# Patient Record
Sex: FEMALE | Race: WHITE | Employment: OTHER | URBAN - METROPOLITAN AREA
[De-identification: names, ages, dates, MRNs, and addresses within clinical notes are randomized per-mention and may not be internally consistent; named-entity substitution may affect disease eponyms.]

---

## 2017-11-14 ENCOUNTER — HOSPITAL ENCOUNTER (OUTPATIENT)
Dept: MAMMOGRAPHY | Age: 77
Discharge: HOME | End: 2017-11-14
Attending: INTERNAL MEDICINE
Payer: MEDICARE

## 2017-11-14 DIAGNOSIS — M85.88 OTHER SPECIFIED DISORDERS OF BONE DENSITY AND STRUCTURE, OTHER SITE: ICD-10-CM

## 2017-11-14 PROCEDURE — 77080 DXA BONE DENSITY AXIAL: CPT

## 2018-07-30 ENCOUNTER — HOSPITAL ENCOUNTER (EMERGENCY)
Age: 78
Discharge: HOME | End: 2018-07-30
Attending: EMERGENCY MEDICINE
Payer: MEDICARE

## 2018-07-30 VITALS
HEART RATE: 91 BPM | BODY MASS INDEX: 30.51 KG/M2 | RESPIRATION RATE: 16 BRPM | OXYGEN SATURATION: 99 % | HEIGHT: 61 IN | SYSTOLIC BLOOD PRESSURE: 175 MMHG | TEMPERATURE: 97.9 F | DIASTOLIC BLOOD PRESSURE: 71 MMHG | WEIGHT: 161.6 LBS

## 2018-07-30 DIAGNOSIS — S61.002A AVULSION OF SKIN OF LEFT THUMB, INITIAL ENCOUNTER: Primary | ICD-10-CM

## 2018-07-30 PROCEDURE — 90471 IMMUNIZATION ADMIN: CPT

## 2018-07-30 PROCEDURE — 90715 TDAP VACCINE 7 YRS/> IM: CPT | Performed by: PHYSICIAN ASSISTANT

## 2018-07-30 PROCEDURE — 99282 EMERGENCY DEPT VISIT SF MDM: CPT

## 2018-07-30 PROCEDURE — 75810000293 HC SIMP/SUPERF WND  RPR

## 2018-07-30 PROCEDURE — 99283 EMERGENCY DEPT VISIT LOW MDM: CPT

## 2018-07-30 PROCEDURE — 74011250636 HC RX REV CODE- 250/636: Performed by: PHYSICIAN ASSISTANT

## 2018-07-30 RX ADMIN — TETANUS TOXOID, REDUCED DIPHTHERIA TOXOID AND ACELLULAR PERTUSSIS VACCINE, ADSORBED 0.5 ML: 5; 2.5; 8; 8; 2.5 SUSPENSION INTRAMUSCULAR at 21:17

## 2018-07-31 NOTE — ED PROVIDER NOTES
EMERGENCY DEPARTMENT HISTORY AND PHYSICAL EXAM      Date: 7/30/2018  Patient Name: Dartha Epley    History of Presenting Illness     Chief Complaint   Patient presents with    Laceration     \"I cut the tip of my finger off. \" occured at 1100 today       History Provided By: Patient    HPI: Dartha Epley, 68 y.o. female presents ambulatory to the ED with cc of 8 hours of constant aching, 1 out of 10 left thumb pain that started after cutting the tip of her thumb with a very sharp knife while cutting cantaloupe. She does take Warfarin and has had a couple of different pressure dressings but they have bled through. Tetanus was last updated in 2012. Chief Complaint: left thumb laceration  Duration: 8 Hours  Timing:  Constant  Location: tip of left thumb  Quality: Aching  Severity: 1 out of 10  Modifying Factors: her dressings / bandages have bled through  Associated Symptoms: denies any other associated signs or symptoms    There are no other complaints, changes, or physical findings at this time. PCP: Tracey Dakin, MD    Current Outpatient Prescriptions   Medication Sig Dispense Refill    warfarin (COUMADIN) 2.5 mg tablet TAKE TWO TABLETS DAILY EXCEPT TAKE ONE AND ONE-HALF TABLETS ON WEDNESDAYS 165 Tab 94    glipiZIDE (GLUCOTROL) 5 mg tablet TAKE 2 TABLETS DAILY 180 Tab 4    LORazepam (ATIVAN) 1 mg tablet Take 1 Tab by mouth every eight (8) hours as needed for Anxiety. Max Daily Amount: 3 mg.  40 Tab 1    enalapril (VASOTEC) 20 mg tablet TAKE 1 TABLET TWICE A  Tab 2    triamterene-hydroCHLOROthiazide (MAXZIDE) 75-50 mg per tablet TAKE 1 TABLET BY MOUTH EVERY DAY 90 Tab 3    metoprolol tartrate (LOPRESSOR) 50 mg tablet TAKE 1/2 TABLET BY MOUTH TWICE DAILY 90 Tab 3    atorvastatin (LIPITOR) 20 mg tablet TAKE 1 TABLET DAILY 90 Tab 3    potassium chloride (K-DUR, KLOR-CON) 20 mEq tablet TAKE 1 TABLET DAILY 90 Tab 3    glucose blood VI test strips (FREESTYLE TEST) strip Check blood sugar twice a day 100 Strip 12    traMADol (ULTRAM) 50 mg tablet Take 1 Tab by mouth every six (6) hours as needed for Pain. Max Daily Amount: 200 mg. 40 Tab 1    famotidine (PEPCID) 20 mg tablet Take 20 mg by mouth two (2) times a day.  famotidine (PEPCID) 20 mg tablet Take 20 mg by mouth two (2) times a day.  warfarin (COUMADIN) 2.5 mg tablet Take 3.75 mg by mouth daily.  Cholecalciferol, Vitamin D3, (VITAMIN D) 1,000 unit Cap Take 1,000 Units by mouth every evening.  therapeutic multivitamin (THERAGRAN) tablet Take 1 Tab by mouth every evening.  calcium carbonate (TUMS E-X) 300 mg (750 mg) chewable tablet Take 2 Tabs by mouth daily (after dinner). Past History     Past Medical History:  Past Medical History:   Diagnosis Date    Anemia     iron deficiency, 2010. . recurrence in 2011    Anemia NEC     iron deficiency.  Arthritis     Autoimmune disease ([address]     POLYMYALGIA RHEUMATICA    Cancer ([address] 2004    SKIN CA CHEST ,renal ca-left, squamous cell ca in situ, left hand skin     DM (diabetes mellitus), type 2 ([address] 8/16/2011    NIDDM    GERD (gastroesophageal reflux disease) 2012    EGD: tortuous esophagus, gastric polypectomy    HTN (hypertension) 8/16/2011    HX OTHER MEDICAL     skin ca. excision    Hypercholesterolemia 8/16/2011    Osteopenia     Polymyalgia rheumatica (HCC)     Renal cell cancer ([facility] 5/12/2014    Clear cell type  Left partial nephrectomy , 5/7/14, Dr. Katy Sanchez Renal mass, left 4-2014    Stroke [facility]) 1994    x2       Past Surgical History:  Past Surgical History:   Procedure Laterality Date    ABDOMEN SURGERY PROC UNLISTED      LAPAROSCOPIC REL. ADHESIONS    BREAST SURGERY PROCEDURE UNLISTED  1990'S    PARTIAL MASTECTOMY  RIGHT    ENDOSCOPY, COLON, DIAGNOSTIC  3/10/10    diverticulosis.  Bipin Nj MD    HX CHOLECYSTECTOMY      HX COLONOSCOPY  3/10/10     Dr. Stan rand Ends HX GI  Feb 2014    RIAZ ; incisional herniorrahaphy y    Hermina Gouge HEENT      b/l cataract surgery    HX HEENT      tonsillectomy    HX OTHER SURGICAL  2012    EGD. Gastric Polypectomy    HX OTHER SURGICAL  5/7/14    left partial nephrectomy , dr. Montez Hardwick HX UROLOGICAL  April 2014    lft partial nephrectomy for ca.  VASCULAR SURGERY PROCEDURE UNLIST  11/13/14    left renal artery pseudoaneurysm ablation        Family History:  Family History   Problem Relation Age of Onset    Hypertension Mother     Kidney Disease Mother     Heart Attack Father     Heart Disease Father     Hypertension Father     Heart Attack Brother 46    Psychiatric Disorder Brother      bipolar    Heart Disease Brother     Stroke Maternal Grandmother        Social History:  Social History   Substance Use Topics    Smoking status: Never Smoker    Smokeless tobacco: Never Used    Alcohol use Yes      Comment: rare       Allergies: Allergies   Allergen Reactions    Macrobid [Nitrofurantoin Monohyd/M-Cryst] Hives and Rash    Metformin Hives and Rash    Ticlid [Ticlopidine] Hives         Review of Systems   Review of Systems   Constitutional: Negative for fatigue and fever. HENT: Negative for ear pain and sore throat. Eyes: Negative for pain, redness and visual disturbance. Respiratory: Negative for cough and shortness of breath. Cardiovascular: Negative for chest pain and palpitations. Gastrointestinal: Negative for abdominal pain, nausea and vomiting. Genitourinary: Negative for dysuria, frequency and urgency. Musculoskeletal: Negative for back pain, gait problem, neck pain and neck stiffness. Skin: Positive for wound. Negative for rash. Neurological: Negative for dizziness, weakness, light-headedness, numbness and headaches. Physical Exam   Physical Exam   Constitutional: She is oriented to person, place, and time. She appears well-developed and well-nourished. Non-toxic appearance. No distress.    HENT:   Head: Normocephalic and atraumatic. Right Ear: External ear normal.   Left Ear: External ear normal.   Nose: Nose normal.   Mouth/Throat: Uvula is midline. No trismus in the jaw. Eyes: Conjunctivae and EOM are normal. Pupils are equal, round, and reactive to light. No scleral icterus. Neck: Normal range of motion and full passive range of motion without pain. Cardiovascular: Normal rate and regular rhythm. Pulmonary/Chest: Effort normal. No accessory muscle usage. No tachypnea. No respiratory distress. She has no decreased breath sounds. She has no wheezes. Abdominal: Soft. There is no tenderness. Musculoskeletal: Normal range of motion. Left hand: She exhibits laceration. Hands:  7mm ovoid avulsion laceration to the tip of the left thumb not involving the nail. FAROM of all joints. Neurological: She is alert and oriented to person, place, and time. She is not disoriented. No cranial nerve deficit. GCS eye subscore is 4. GCS verbal subscore is 5. GCS motor subscore is 6. Skin: Skin is intact. No rash noted. Psychiatric: She has a normal mood and affect. Her speech is normal.   Nursing note and vitals reviewed. Diagnostic Study Results     Labs -   No results found for this or any previous visit (from the past 12 hour(s)). Radiologic Studies -   No orders to display     CT Results  (Last 48 hours)    None        CXR Results  (Last 48 hours)    None            Medical Decision Making   I am the first provider for this patient. I reviewed the vital signs, available nursing notes, past medical history, past surgical history, family history and social history. Vital Signs-Reviewed the patient's vital signs. Patient Vitals for the past 12 hrs:   Temp Pulse Resp BP SpO2   07/30/18 2101 97.9 °F (36.6 °C) 91 16 175/71 99 %     Records Reviewed: Nursing Notes and Old Medical Records    Provider Notes (Medical Decision Making): Will update tetanus.   Mechanism does not suggest the likelihood of fracture or foreign body: imaging deferred  Will permit to heal by secondary intention: sterile bulky pressure dressing applied. Wound care instructions. Return precautions. ED Course:   Initial assessment performed. The patients presenting problems have been discussed, and they are in agreement with the care plan formulated and outlined with them. I have encouraged them to ask questions as they arise throughout their visit. Disposition:  Discharge    PLAN:  1. Current Discharge Medication List        2. Follow-up Information     Follow up With Details Comments [address], [place], [place] Schedule an appointment as soon as possible for a visit As needed [address] [phone]          Return to ED if worse     Diagnosis     Clinical Impression:   1.  Avulsion of skin of left thumb, initial encounter

## 2018-07-31 NOTE — ED NOTES
Pt with lac to L hand thumb after using knife to cut cantaloupe. Pt states she is on Coumadin. Pt with dressing present, break through bleeding noticed at this time.

## 2018-07-31 NOTE — ED NOTES
Discharge instructions given to patient by BERYL Millard. Verbalized understanding of instructions. Patient discharged without difficulty. Patient discharged in stable condition via ambulation accompanied by .

## 2018-08-01 PROBLEM — E11.21 TYPE 2 DIABETES WITH NEPHROPATHY (HCC): Status: ACTIVE | Noted: 2018-08-01

## 2020-12-01 ENCOUNTER — HOSPITAL ENCOUNTER (OUTPATIENT)
Dept: GENERAL RADIOLOGY | Age: 80
Discharge: HOME | End: 2020-12-01
Attending: INTERNAL MEDICINE
Payer: MEDICARE

## 2020-12-01 DIAGNOSIS — M54.50 ACUTE BILATERAL LOW BACK PAIN WITHOUT SCIATICA: ICD-10-CM

## 2020-12-01 PROCEDURE — 72100 X-RAY EXAM L-S SPINE 2/3 VWS: CPT

## 2021-05-06 PROBLEM — S22.080A COMPRESSION FRACTURE OF T11 VERTEBRA (HCC): Status: ACTIVE | Noted: 2021-05-06

## 2022-03-19 PROBLEM — E11.21 TYPE 2 DIABETES WITH NEPHROPATHY (HCC): Status: ACTIVE | Noted: 2018-08-01

## 2022-03-19 PROBLEM — S22.080A COMPRESSION FRACTURE OF T11 VERTEBRA (HCC): Status: ACTIVE | Noted: 2021-05-06

## 2022-07-26 ENCOUNTER — OFFICE VISIT (OUTPATIENT)
Dept: URGENT CARE | Age: 82
End: 2022-07-26
Payer: MEDICARE

## 2022-07-26 VITALS
BODY MASS INDEX: 28.32 KG/M2 | DIASTOLIC BLOOD PRESSURE: 84 MMHG | SYSTOLIC BLOOD PRESSURE: 155 MMHG | RESPIRATION RATE: 18 BRPM | OXYGEN SATURATION: 97 % | HEIGHT: 61 IN | HEART RATE: 78 BPM | TEMPERATURE: 97.8 F | WEIGHT: 150 LBS

## 2022-07-26 DIAGNOSIS — M25.552 HIP PAIN, CHRONIC, LEFT: ICD-10-CM

## 2022-07-26 DIAGNOSIS — M54.42 CHRONIC BILATERAL LOW BACK PAIN WITH LEFT-SIDED SCIATICA: Primary | ICD-10-CM

## 2022-07-26 DIAGNOSIS — G89.29 HIP PAIN, CHRONIC, LEFT: ICD-10-CM

## 2022-07-26 DIAGNOSIS — G89.29 CHRONIC BILATERAL LOW BACK PAIN WITH LEFT-SIDED SCIATICA: Primary | ICD-10-CM

## 2022-07-26 PROCEDURE — 1090F PRES/ABSN URINE INCON ASSESS: CPT | Performed by: INTERNAL MEDICINE

## 2022-07-26 PROCEDURE — G8427 DOCREV CUR MEDS BY ELIG CLIN: HCPCS | Performed by: INTERNAL MEDICINE

## 2022-07-26 PROCEDURE — G8536 NO DOC ELDER MAL SCRN: HCPCS | Performed by: INTERNAL MEDICINE

## 2022-07-26 PROCEDURE — G8754 DIAS BP LESS 90: HCPCS | Performed by: INTERNAL MEDICINE

## 2022-07-26 PROCEDURE — G8753 SYS BP > OR = 140: HCPCS | Performed by: INTERNAL MEDICINE

## 2022-07-26 PROCEDURE — 99202 OFFICE O/P NEW SF 15 MIN: CPT | Performed by: INTERNAL MEDICINE

## 2022-07-26 PROCEDURE — G8417 CALC BMI ABV UP PARAM F/U: HCPCS | Performed by: INTERNAL MEDICINE

## 2022-07-26 PROCEDURE — 1101F PT FALLS ASSESS-DOCD LE1/YR: CPT | Performed by: INTERNAL MEDICINE

## 2022-07-26 PROCEDURE — G8432 DEP SCR NOT DOC, RNG: HCPCS | Performed by: INTERNAL MEDICINE

## 2022-07-26 NOTE — PROGRESS NOTES
HISTORY OF PRESENT ILLNESS  Kaylan Colon is a 80 y.o. female. Patient was seen with daughter. Reports in the last few weeks her back pain has increased. Reports that it has been radiating down her left leg now and she feels like it is going to give out. No falls. Was given tramadol by her PCP. This has helped some. Has also been in steroids for years for poly rheumatica. Is on 3 mg now, daily. Describes the pain at a 6 most of the time. Denies numbness and tingling. Has not has a DEXA in a few years. Last one did show osteopenia. Visit Vitals  BP (!) 155/84   Pulse 78   Temp 97.8 °F (36.6 °C)   Resp 18   Ht 5' 1\" (1.549 m)   Wt 150 lb (68 kg)   SpO2 97%   BMI 28.34 kg/m²     Past Medical History:   Diagnosis Date    Anemia     iron deficiency, 2010. . recurrence in 2011    Anemia NEC     iron deficiency. Arthritis     including lumbar arthritis     Autoimmune disease ([address]     POLYMYALGIA RHEUMATICA    Cancer ([address] 2004    SKIN CA  Basal cell on nose; also skin ca on CHEST ,renal ca-left, squamous cell ca in situ, left hand skin     Closed wedge compression fracture of T11 vertebra (HCC)     Compression fracture of T11 vertebra ([address] 5/6/2021    DM (diabetes mellitus), type 2 ([address] 8/16/2011    NIDDM    GERD (gastroesophageal reflux disease) 2012    EGD: tortuous esophagus, gastric polypectomy    HTN (hypertension) 8/16/2011    HX OTHER MEDICAL     skin ca. excision    Hypercholesterolemia 8/16/2011    Osteopenia     Other insomnia     Polymyalgia rheumatica (HCC)     Post-menopausal osteoporosis     tx 1995 Estrogen; later Didronel, Actonel; 2011 Fosamax; 2012, 2013 Boniva     Renal cell cancer ([address] 5/12/2014    Clear cell type  Left partial nephrectomy , 5/7/14, Dr. Corey Jiang     Renal mass, left 4-2014    Stroke [facility]) 1994    x2     Past Surgical History:   Procedure Laterality Date    ENDOSCOPY, COLON, DIAGNOSTIC  3/10/10    diverticulosis.  Harvey Morales MD    HX CHOLECYSTECTOMY      HX COLONOSCOPY 3/10/10     giorgi, Dr. Reynaldo Weiss GI  Feb 2014    RIAZ ; incisional herniorrahaphy y     HX HEENT      b/l cataract surgery    HX HEENT      tonsillectomy    HX OTHER SURGICAL  2012    EGD. Gastric Polypectomy    HX OTHER SURGICAL  5/7/14    left partial nephrectomy , dr. Coni Silva OTHER SURGICAL      HX UROLOGICAL  April 2014    lft partial nephrectomy for ca. OR ABDOMEN SURGERY PROC UNLISTED      LAPAROSCOPIC REL. ADHESIONS    OR BREAST SURGERY PROCEDURE UNLISTED  80'S    PARTIAL MASTECTOMY  RIGHT    VASCULAR SURGERY PROCEDURE UNLIST  11/13/14    left renal artery pseudoaneurysm ablation      Family History   Problem Relation Age of Onset    Hypertension Mother     Kidney Disease Mother     Heart Attack Father     Heart Disease Father     Hypertension Father     Heart Attack Brother 46    Psychiatric Disorder Brother         bipolar    Heart Disease Brother     Alcohol abuse Brother     Stroke Maternal Grandmother      Outpatient Encounter Medications as of 7/26/2022   Medication Sig Dispense Refill    traMADoL (ULTRAM) 50 mg tablet Take 1 Tablet by mouth every six (6) hours as needed for Pain for up to 7 days. Max Daily Amount: 200 mg. 28 Tablet 0    LORazepam (ATIVAN) 1 mg tablet TAKE 1 1/2 TABLETS BY MOUTH ONCE EVERY 8 HOURS AS NEEDED FOR ANXIETY 45 Tablet 5    Insulin Syringe-Needle U-100 0.3 mL 29 gauge x 1/2\" syrg USE ONE SYRINGE DAILY 30 Each 11    atorvastatin (LIPITOR) 20 mg tablet TAKE 1 TABLET DAILY 90 Tablet 3    insulin glargine (Lantus U-100 Insulin) 100 unit/mL injection INJECT 5 UNITS UNDER THE SKIN EACH MORNING AS DIRECTED. DXE11.9 10 mL 11    predniSONE (DELTASONE) 5 mg tablet TAKE 1 TABLET BY MOUTH DAILY. OR AS DIRECTED 30 Tablet 1    enalapril (VASOTEC) 20 mg tablet TAKE 1 TABLET TWICE A  Tablet 3    predniSONE (DELTASONE) 1 mg tablet Take 3 mg by mouth daily (with breakfast).       warfarin (COUMADIN) 2.5 mg tablet TAKE 2 TABLETS DAILY EXCEPT TAKE ONE AND ONE-HALF TABLETS ON WEDNESDAYS 165 Tablet 3    triamterene-hydroCHLOROthiazide (MAXZIDE) 75-50 mg per tablet TAKE 1 TABLET BY MOUTH EVERY DAY 90 Tablet 3    metoprolol tartrate (LOPRESSOR) 50 mg tablet TAKE 1/2 TABLET BY MOUTH TWICE A DAY 90 Tablet 3    predniSONE (DELTASONE) 1 mg tablet 4 pills each evening in place of Prednisone 5 mg in the evening or as directed (Patient taking differently: 2 mg daily.) 120 Tablet 11    Klor-Con M20 20 mEq tablet TAKE 1 TABLET DAILY 90 Tablet 3    insulin glargine (Lantus U-100 Insulin) 100 unit/mL injection 15 Units by SubCUTAneous route daily. glucose blood VI test strips (FreeStyle Lite Strips) strip CHECK BLOOD SUGAR THREE TIMES A DAY. DXE11.9 100 Strip 12    diclofenac (VOLTAREN) 1 % gel APPLY TO AFFECTED AREA FOUR (4) TIMES DAILY. 100 g 11    naloxone (NARCAN) 4 mg/actuation nasal spray Use 1 spray intranasally, then discard. Repeat with new spray every 2 min as needed for opioid overdose symptoms, alternating nostrils. 1 Each 1    famotidine (PEPCID) 20 mg tablet Take 20 mg by mouth two (2) times a day. famotidine (PEPCID) 20 mg tablet Take 20 mg by mouth two (2) times a day. Cholecalciferol, Vitamin D3, (VITAMIN D) 1,000 unit Cap Take 1,000 Units by mouth every evening. therapeutic multivitamin (THERAGRAN) tablet Take 1 Tab by mouth every evening. calcium carbonate (TUMS E-X) 300 mg (750 mg) chewable tablet Take 2 Tabs by mouth daily (after dinner). No facility-administered encounter medications on file as of 7/26/2022. HPI    Review of Systems   Constitutional: Negative. Respiratory: Negative. Cardiovascular: Negative. Musculoskeletal:  Positive for back pain and joint pain. Negative for falls. Neurological: Negative. Physical Exam  Vitals and nursing note reviewed. Cardiovascular:      Rate and Rhythm: Normal rate and regular rhythm. Pulmonary:      Effort: Pulmonary effort is normal.      Breath sounds: Normal breath sounds. Musculoskeletal:      Lumbar back: Spasms present. No swelling or tenderness. Normal range of motion. Negative left straight leg raise test.      Right hip: Normal.      Left hip: No tenderness. Decreased range of motion. Decreased strength. Neurological:      Mental Status: She is alert and oriented to person, place, and time. Psychiatric:         Behavior: Behavior normal.       ASSESSMENT and PLAN  Diagnoses and all orders for this visit:    1. Chronic bilateral low back pain with left-sided sciatica  -     XR SPINE LUMB 2 OR 3 V; Future    2. Hip pain, chronic, left  -     XR HIP LT W OR WO PELV 2-3 VWS; Future    Suggested patient to use tramadol ans tylenol as given by PCP.    Encouraged PT    lab results and schedule of future lab studies reviewed with patient  reviewed diet, exercise and weight control  reviewed medications and side effects in detail

## 2022-08-09 PROBLEM — F11.99 OPIOID USE, UNSPECIFIED WITH UNSPECIFIED OPIOID-INDUCED DISORDER (HCC): Status: ACTIVE | Noted: 2022-08-09

## 2022-10-07 ENCOUNTER — HOSPITAL ENCOUNTER (OUTPATIENT)
Dept: CT IMAGING | Age: 82
Discharge: HOME | End: 2022-10-07
Attending: INTERNAL MEDICINE
Payer: MEDICARE

## 2022-10-07 DIAGNOSIS — R10.30 LOWER ABDOMINAL PAIN: ICD-10-CM

## 2022-10-07 LAB — CREAT BLD-MCNC: 1 MG/DL (ref 0.6–1.3)

## 2022-10-07 PROCEDURE — 74011000636 HC RX REV CODE- 636

## 2022-10-07 PROCEDURE — 74177 CT ABD & PELVIS W/CONTRAST: CPT

## 2022-10-07 PROCEDURE — 82565 ASSAY OF CREATININE: CPT

## 2022-10-07 RX ADMIN — IOPAMIDOL 100 ML: 755 INJECTION, SOLUTION INTRAVENOUS at 17:12

## 2022-10-11 ENCOUNTER — TRANSCRIBE ORDER (OUTPATIENT)
Dept: SCHEDULING | Age: 82
End: 2022-10-11

## 2022-10-11 DIAGNOSIS — R10.30 GROIN PAIN: Primary | ICD-10-CM

## 2023-01-27 PROBLEM — D68.69 SECONDARY HYPERCOAGULABLE STATE (HCC): Status: ACTIVE | Noted: 2023-01-27

## 2023-10-31 ENCOUNTER — HOSPITAL ENCOUNTER (OUTPATIENT)
Facility: HOSPITAL | Age: 83
Discharge: HOME | End: 2023-11-03
Attending: INTERNAL MEDICINE
Payer: MEDICARE

## 2023-10-31 DIAGNOSIS — Z78.0 POST-MENOPAUSAL: ICD-10-CM

## 2023-10-31 PROCEDURE — 77080 DXA BONE DENSITY AXIAL: CPT
